# Patient Record
Sex: FEMALE | Race: WHITE | NOT HISPANIC OR LATINO | URBAN - METROPOLITAN AREA
[De-identification: names, ages, dates, MRNs, and addresses within clinical notes are randomized per-mention and may not be internally consistent; named-entity substitution may affect disease eponyms.]

---

## 2019-11-21 ENCOUNTER — EMERGENCY (EMERGENCY)
Facility: HOSPITAL | Age: 44
LOS: 1 days | Discharge: HOME | End: 2019-11-21
Attending: EMERGENCY MEDICINE | Admitting: EMERGENCY MEDICINE
Payer: COMMERCIAL

## 2019-11-21 VITALS
OXYGEN SATURATION: 97 % | DIASTOLIC BLOOD PRESSURE: 100 MMHG | HEART RATE: 91 BPM | HEIGHT: 67 IN | TEMPERATURE: 98 F | RESPIRATION RATE: 18 BRPM | SYSTOLIC BLOOD PRESSURE: 167 MMHG | WEIGHT: 229.94 LBS

## 2019-11-21 VITALS
HEART RATE: 80 BPM | TEMPERATURE: 98 F | DIASTOLIC BLOOD PRESSURE: 93 MMHG | RESPIRATION RATE: 18 BRPM | SYSTOLIC BLOOD PRESSURE: 148 MMHG | OXYGEN SATURATION: 97 %

## 2019-11-21 PROCEDURE — 85025 COMPLETE CBC W/AUTO DIFF WBC: CPT

## 2019-11-21 PROCEDURE — 76856 US EXAM PELVIC COMPLETE: CPT

## 2019-11-21 PROCEDURE — 99284 EMERGENCY DEPT VISIT MOD MDM: CPT | Mod: 25

## 2019-11-21 PROCEDURE — 76856 US EXAM PELVIC COMPLETE: CPT | Mod: 26

## 2019-11-21 PROCEDURE — 99284 EMERGENCY DEPT VISIT MOD MDM: CPT

## 2019-11-21 PROCEDURE — 80053 COMPREHEN METABOLIC PANEL: CPT

## 2019-11-21 PROCEDURE — 76830 TRANSVAGINAL US NON-OB: CPT | Mod: 26

## 2019-11-21 PROCEDURE — 96374 THER/PROPH/DIAG INJ IV PUSH: CPT

## 2019-11-21 PROCEDURE — 81003 URINALYSIS AUTO W/O SCOPE: CPT

## 2019-11-21 PROCEDURE — 74176 CT ABD & PELVIS W/O CONTRAST: CPT

## 2019-11-21 PROCEDURE — 87086 URINE CULTURE/COLONY COUNT: CPT

## 2019-11-21 PROCEDURE — 76830 TRANSVAGINAL US NON-OB: CPT

## 2019-11-21 PROCEDURE — 36415 COLL VENOUS BLD VENIPUNCTURE: CPT

## 2019-11-21 PROCEDURE — 74176 CT ABD & PELVIS W/O CONTRAST: CPT | Mod: 26

## 2019-11-21 RX ORDER — METHOCARBAMOL 500 MG/1
2 TABLET, FILM COATED ORAL
Qty: 40 | Refills: 0
Start: 2019-11-21 | End: 2019-11-30

## 2019-11-21 RX ORDER — IBUPROFEN 200 MG
1 TABLET ORAL
Qty: 30 | Refills: 0
Start: 2019-11-21

## 2019-11-21 RX ORDER — KETOROLAC TROMETHAMINE 30 MG/ML
15 SYRINGE (ML) INJECTION ONCE
Refills: 0 | Status: DISCONTINUED | OUTPATIENT
Start: 2019-11-21 | End: 2019-11-21

## 2019-11-21 RX ORDER — LIDOCAINE 4 G/100G
1 CREAM TOPICAL ONCE
Refills: 0 | Status: COMPLETED | OUTPATIENT
Start: 2019-11-21 | End: 2019-11-21

## 2019-11-21 RX ORDER — METHOCARBAMOL 500 MG/1
1500 TABLET, FILM COATED ORAL ONCE
Refills: 0 | Status: COMPLETED | OUTPATIENT
Start: 2019-11-21 | End: 2019-11-21

## 2019-11-21 RX ADMIN — Medication 15 MILLIGRAM(S): at 16:19

## 2019-11-21 RX ADMIN — METHOCARBAMOL 1500 MILLIGRAM(S): 500 TABLET, FILM COATED ORAL at 20:20

## 2019-11-21 RX ADMIN — LIDOCAINE 1 PATCH: 4 CREAM TOPICAL at 20:21

## 2019-11-21 RX ADMIN — Medication 15 MILLIGRAM(S): at 16:34

## 2019-11-21 NOTE — ED ADULT NURSE NOTE - NSIMPLEMENTINTERV_GEN_ALL_ED
Implemented All Universal Safety Interventions:  [place] to call system. Call bell, personal items and telephone within reach. Instruct patient to call for assistance. Room bathroom lighting operational. Non-slip footwear when patient is off stretcher. Physically safe environment: no spills, clutter or unnecessary equipment. Stretcher in lowest position, wheels locked, appropriate side rails in place.

## 2019-11-21 NOTE — ED PROVIDER NOTE - NSFOLLOWUPINSTRUCTIONS_ED_ALL_ED_FT
BACK PAIN - General Information     Back Pain    WHAT YOU NEED TO KNOW:    What do I need to know about back pain? Back pain is common. You may feel sore or stiff on one or both sides of your back. The pain may spread to your buttocks or thighs.    What causes or increases my risk for back pain? Conditions that affect the spine, joints, or muscles can cause back pain. These may include arthritis, spinal stenosis (narrowing of the spinal column), muscle tension, or breakdown of the spinal discs. The following increase your risk of back pain:     Repeated bending, lifting, or twisting, or lifting heavy items      Injury from a fall or accident      Lack of regular physical activity       Obesity or pregnancy       Smoking      Aging      Driving, sitting, or standing for long periods      Bad posture while sitting or standing    How is back pain diagnosed? Your healthcare provider will ask if you have any medical conditions. He or she may ask if you have a history of back pain and how it started. He or she may watch you stand and walk, and check your range of motion. Show him or her where you feel pain and what makes it better or worse. Describe the pain, how bad it is, and how long it lasts. Tell your provider if your pain worsens at night or when you lie on your back.    How is back pain treated?    NSAIDs help decrease swelling and pain. This medicine is available with or without a doctor's order. NSAIDs can cause stomach bleeding or kidney problems in certain people. If you take blood thinner medicine, always ask your healthcare provider if NSAIDs are safe for you. Always read the medicine label and follow directions.      Acetaminophen decreases pain and fever. It is available without a doctor's order. Ask how much to take and how often to take it. Follow directions. Read the labels of all other medicines you are using to see if they also contain acetaminophen, or ask your doctor or pharmacist. Acetaminophen can cause liver damage if not taken correctly. Do not use more than 4 grams (4,000 milligrams) total of acetaminophen in one day.       Muscle relaxers help decrease muscle spasms and back pain.      Prescription pain medicine may be given. Ask your healthcare provider how to take this medicine safely. Some prescription pain medicines contain acetaminophen. Do not take other medicines that contain acetaminophen without talking to your healthcare provider. Too much acetaminophen may cause liver damage. Prescription pain medicine may cause constipation. Ask your healthcare provider how to prevent or treat constipation.     How do I manage my back pain?     Apply ice on your back for 15 to 20 minutes every hour or as directed. Use an ice pack, or put crushed ice in a plastic bag. Cover it with a towel before you apply it to your skin. Ice helps prevent tissue damage and decreases pain.      Apply heat on your back for 20 to 30 minutes every 2 hours for as many days as directed. Heat helps decrease pain and muscle spasms.      Stay active as much as you can without causing more pain. Bed rest could make your back pain worse. Avoid heavy lifting until your pain is gone.      Go to physical therapy as directed. A physical therapist can teach you exercises to help improve movement and strength, and to decrease pain.    When should I seek immediate care?     You have pain, numbness, or weakness in one or both legs.      Your pain becomes so severe that you cannot walk.      You cannot control your urine or bowel movements.      You have severe back pain with chest pain.      You have severe back pain, nausea, and vomiting.      You have severe back pain that spreads to your side or genital area.    When should I contact my healthcare provider?     You have back pain that does not get better with rest and pain medicine.      You have a fever.      You have pain that worsens when you are on your back or when you rest.      You have pain that worsens when you cough or sneeze.      You lose weight without trying.      You have questions or concerns about your condition or care.    CARE AGREEMENT:    You have the right to help plan your care. Learn about your health condition and how it may be treated. Discuss treatment options with your healthcare providers to decide what care you want to receive. You always have the right to refuse treatment.        © Copyright [employer] 2019       back to top                      © Copyright [employer] 2019

## 2019-11-21 NOTE — ED PROVIDER NOTE - OBJECTIVE STATEMENT
44F nonsmoker, otherwise healthy, lmp 10/24/19, c/o acute nonpositional sharp L flank pain this AM that has now radiated to L lateral abd. no fever/chills, no uri/cough, no cp/sob, no n/v, no diarrhea/constipation, no hematochezia/melena, no dysuria/hematuria, no vaginal discharge/spotting, no phx std, no phx/fhx nephrolithiasis, no rash, no trauma, no prior episode.

## 2019-11-21 NOTE — ED PROVIDER NOTE - PATIENT PORTAL LINK FT
You can access the FollowMyHealth Patient Portal offered by [facility] by registering at the following website: [email or web address]. By joining [employer]’s FollowMyHealth portal, you will also be able to view your health information using other applications (apps) compatible with our system.

## 2019-11-21 NOTE — ED PROVIDER NOTE - CLINICAL SUMMARY MEDICAL DECISION MAKING FREE TEXT BOX
avss. nontoxic. NAD. no acute surgical abd. no red flags. no risk factors. pain controlled. tolerating po. no e/o sepsis. ct a/p and pelvic us only w/ e/o fibroids. will dc home w/ outpatient pcp fu, robaxin/ibuprofen,  strict return precautions. pt agrees w/ plan. questions answered.

## 2019-11-21 NOTE — ED ADULT NURSE NOTE - OBJECTIVE STATEMENT
Pt CO sudden onset Left flank pain today.  Pt states "this has never happened before."  Pt denies falls, trauma, dizziness, N/V/D, SOB, Fevers and CP.  No sig PMhx per pt.

## 2019-11-21 NOTE — ED PROVIDER NOTE - PHYSICAL EXAMINATION
CONST: nontoxic NAD speaking in full sentences  HEAD: atraumatic  EYES: conjunctivae clear  ENT: mmm  NECK: supple  CARD: rrr no murmurs  CHEST: ctab no r/r/w, no stridor/retractions/tripoding  ABD: soft, nd, +mild L mid abd ttp, no rebound/guarding, +L cvat  EXT: FROM, symmetric distal pulses intact  SKIN: warm, dry, no rash, no pedal edema, cap refill <2sec  NEURO: a+ox3, 5/5 strength x4, gross sensation intact x4, normal gait

## 2019-11-25 DIAGNOSIS — R10.9 UNSPECIFIED ABDOMINAL PAIN: ICD-10-CM

## 2019-11-25 DIAGNOSIS — M54.9 DORSALGIA, UNSPECIFIED: ICD-10-CM

## 2019-11-25 DIAGNOSIS — D25.9 LEIOMYOMA OF UTERUS, UNSPECIFIED: ICD-10-CM

## 2021-01-21 ENCOUNTER — EMERGENCY (EMERGENCY)
Facility: HOSPITAL | Age: 46
LOS: 1 days | Discharge: HOME | End: 2021-01-21
Attending: EMERGENCY MEDICINE | Admitting: EMERGENCY MEDICINE
Payer: COMMERCIAL

## 2021-01-21 VITALS
SYSTOLIC BLOOD PRESSURE: 163 MMHG | HEIGHT: 67 IN | OXYGEN SATURATION: 99 % | DIASTOLIC BLOOD PRESSURE: 96 MMHG | HEART RATE: 92 BPM | RESPIRATION RATE: 18 BRPM | WEIGHT: 235.01 LBS | TEMPERATURE: 98 F

## 2021-01-21 VITALS
HEART RATE: 77 BPM | SYSTOLIC BLOOD PRESSURE: 161 MMHG | DIASTOLIC BLOOD PRESSURE: 101 MMHG | RESPIRATION RATE: 16 BRPM | OXYGEN SATURATION: 100 %

## 2021-01-21 DIAGNOSIS — R42 DIZZINESS AND GIDDINESS: ICD-10-CM

## 2021-01-21 DIAGNOSIS — Z88.8 ALLERGY STATUS TO OTHER DRUGS, MEDICAMENTS AND BIOLOGICAL SUBSTANCES: ICD-10-CM

## 2021-01-21 PROBLEM — Z78.9 OTHER SPECIFIED HEALTH STATUS: Chronic | Status: ACTIVE | Noted: 2019-11-21

## 2021-01-21 LAB
ALBUMIN SERPL ELPH-MCNC: 4.3 G/DL — SIGNIFICANT CHANGE UP (ref 3.3–5)
ALP SERPL-CCNC: 85 U/L — SIGNIFICANT CHANGE UP (ref 40–120)
ALT FLD-CCNC: 17 U/L — SIGNIFICANT CHANGE UP (ref 10–45)
ANION GAP SERPL CALC-SCNC: 13 MMOL/L — SIGNIFICANT CHANGE UP (ref 5–17)
AST SERPL-CCNC: 23 U/L — SIGNIFICANT CHANGE UP (ref 10–40)
BASOPHILS # BLD AUTO: 0.05 K/UL — SIGNIFICANT CHANGE UP (ref 0–0.2)
BASOPHILS NFR BLD AUTO: 0.6 % — SIGNIFICANT CHANGE UP (ref 0–2)
BILIRUB SERPL-MCNC: 0.4 MG/DL — SIGNIFICANT CHANGE UP (ref 0.2–1.2)
BUN SERPL-MCNC: 12 MG/DL — SIGNIFICANT CHANGE UP (ref 7–23)
CALCIUM SERPL-MCNC: 9 MG/DL — SIGNIFICANT CHANGE UP (ref 8.4–10.5)
CHLORIDE SERPL-SCNC: 100 MMOL/L — SIGNIFICANT CHANGE UP (ref 96–108)
CO2 SERPL-SCNC: 26 MMOL/L — SIGNIFICANT CHANGE UP (ref 22–31)
CREAT SERPL-MCNC: 0.52 MG/DL — SIGNIFICANT CHANGE UP (ref 0.5–1.3)
EOSINOPHIL # BLD AUTO: 0.19 K/UL — SIGNIFICANT CHANGE UP (ref 0–0.5)
EOSINOPHIL NFR BLD AUTO: 2.2 % — SIGNIFICANT CHANGE UP (ref 0–6)
GLUCOSE SERPL-MCNC: 108 MG/DL — HIGH (ref 70–99)
HCG UR QL: NEGATIVE — SIGNIFICANT CHANGE UP
HCT VFR BLD CALC: 45.8 % — HIGH (ref 34.5–45)
HGB BLD-MCNC: 15.4 G/DL — SIGNIFICANT CHANGE UP (ref 11.5–15.5)
IMM GRANULOCYTES NFR BLD AUTO: 0.7 % — SIGNIFICANT CHANGE UP (ref 0–1.5)
LYMPHOCYTES # BLD AUTO: 2.92 K/UL — SIGNIFICANT CHANGE UP (ref 1–3.3)
LYMPHOCYTES # BLD AUTO: 34.1 % — SIGNIFICANT CHANGE UP (ref 13–44)
MCHC RBC-ENTMCNC: 30.9 PG — SIGNIFICANT CHANGE UP (ref 27–34)
MCHC RBC-ENTMCNC: 33.6 GM/DL — SIGNIFICANT CHANGE UP (ref 32–36)
MCV RBC AUTO: 92 FL — SIGNIFICANT CHANGE UP (ref 80–100)
MONOCYTES # BLD AUTO: 0.91 K/UL — HIGH (ref 0–0.9)
MONOCYTES NFR BLD AUTO: 10.6 % — SIGNIFICANT CHANGE UP (ref 2–14)
NEUTROPHILS # BLD AUTO: 4.43 K/UL — SIGNIFICANT CHANGE UP (ref 1.8–7.4)
NEUTROPHILS NFR BLD AUTO: 51.8 % — SIGNIFICANT CHANGE UP (ref 43–77)
NRBC # BLD: 0 /100 WBCS — SIGNIFICANT CHANGE UP (ref 0–0)
PLATELET # BLD AUTO: 235 K/UL — SIGNIFICANT CHANGE UP (ref 150–400)
POTASSIUM SERPL-MCNC: 3.7 MMOL/L — SIGNIFICANT CHANGE UP (ref 3.5–5.3)
POTASSIUM SERPL-SCNC: 3.7 MMOL/L — SIGNIFICANT CHANGE UP (ref 3.5–5.3)
PROT SERPL-MCNC: 7.9 G/DL — SIGNIFICANT CHANGE UP (ref 6–8.3)
RBC # BLD: 4.98 M/UL — SIGNIFICANT CHANGE UP (ref 3.8–5.2)
RBC # FLD: 13.6 % — SIGNIFICANT CHANGE UP (ref 10.3–14.5)
SODIUM SERPL-SCNC: 139 MMOL/L — SIGNIFICANT CHANGE UP (ref 135–145)
WBC # BLD: 8.56 K/UL — SIGNIFICANT CHANGE UP (ref 3.8–10.5)
WBC # FLD AUTO: 8.56 K/UL — SIGNIFICANT CHANGE UP (ref 3.8–10.5)

## 2021-01-21 PROCEDURE — 93005 ELECTROCARDIOGRAM TRACING: CPT

## 2021-01-21 PROCEDURE — 82962 GLUCOSE BLOOD TEST: CPT

## 2021-01-21 PROCEDURE — 99284 EMERGENCY DEPT VISIT MOD MDM: CPT

## 2021-01-21 PROCEDURE — 36415 COLL VENOUS BLD VENIPUNCTURE: CPT

## 2021-01-21 PROCEDURE — 99283 EMERGENCY DEPT VISIT LOW MDM: CPT | Mod: 25

## 2021-01-21 PROCEDURE — 85025 COMPLETE CBC W/AUTO DIFF WBC: CPT

## 2021-01-21 PROCEDURE — 80053 COMPREHEN METABOLIC PANEL: CPT

## 2021-01-21 PROCEDURE — 81025 URINE PREGNANCY TEST: CPT

## 2021-01-21 RX ORDER — MECLIZINE HCL 12.5 MG
1 TABLET ORAL
Qty: 18 | Refills: 0
Start: 2021-01-21 | End: 2021-01-26

## 2021-01-21 RX ORDER — MECLIZINE HCL 12.5 MG
25 TABLET ORAL ONCE
Refills: 0 | Status: COMPLETED | OUTPATIENT
Start: 2021-01-21 | End: 2021-01-21

## 2021-01-21 RX ORDER — SODIUM CHLORIDE 9 MG/ML
1000 INJECTION INTRAMUSCULAR; INTRAVENOUS; SUBCUTANEOUS ONCE
Refills: 0 | Status: COMPLETED | OUTPATIENT
Start: 2021-01-21 | End: 2021-01-21

## 2021-01-21 RX ADMIN — SODIUM CHLORIDE 1000 MILLILITER(S): 9 INJECTION INTRAMUSCULAR; INTRAVENOUS; SUBCUTANEOUS at 07:59

## 2021-01-21 RX ADMIN — Medication 25 MILLIGRAM(S): at 07:59

## 2021-01-21 NOTE — ED ADULT NURSE REASSESSMENT NOTE - NS ED NURSE REASSESS COMMENT FT1
Nursing care transferred to YUDY Quiroga from night shift. Pt resting in stretcher, NS running, IV site patent. Pt reports persistent feeling of "lightness" in her words. Plan to reassess 50 mins s/p meclizine at 8:50.

## 2021-01-21 NOTE — ED PROVIDER NOTE - PROGRESS NOTE DETAILS
[place]: labs grossly wnl, pt asymptomatic. Clinically no indication for further emergent ED workup or hospitalization at this time. Comfortable for dc, outpt f/u.

## 2021-01-21 NOTE — ED ADULT NURSE NOTE - OBJECTIVE STATEMENT
47 y/o female w/ PMH HTN presents to ED for c/o dizziness noted upon waking this am around 0500 "like the room is spinning", with some associated lightheadedness. denies facial droop/slurred speech/weakness/CP/SOB.

## 2021-01-21 NOTE — ED PROVIDER NOTE - PATIENT PORTAL LINK FT
You can access the FollowMyHealth Patient Portal offered by [facility] by registering at the following website: [email or web address]. By joining [email or web address] FollowMyHealth portal, you will also be able to view your health information using other applications (apps) compatible with our system.

## 2021-01-21 NOTE — ED PROVIDER NOTE - PHYSICAL EXAMINATION
PERRL, EOMI, no nystagmus. CN intact. Strength 5/5. Normal F-->N, H-->S. Steady unassisted gait. No pronator drift. Sensation intact. Normal speech, no dysarthria. No carotid bruits. Negative Romberg, normal tandem gait.   no LE edema, normal equal distal pulses, steady unassisted gait.

## 2021-01-21 NOTE — ED PROVIDER NOTE - CLINICAL SUMMARY MEDICAL DECISION MAKING FREE TEXT BOX
46F PMH HTN, p/w dizziness. Went to sleep ~2100 feeling like normal self. Awoke this morning  feeling vague dizziness/lightheaded. Cant fully describe but feels like shes hung over, only w/ position change, lasts ~1min then resolves. No other systemic symptoms. Mild HTN (has not yet taken am BP med), other vitals wnl, exam as above. Very well appearing.  ddx: Likely peripheral vertigo. Clinically not CVA.   BAsic labs, ucg, IVF/meclizine, observe in ED, reassess.

## 2021-01-21 NOTE — ED ADULT TRIAGE NOTE - OTHER COMPLAINTS
pt woke up at 5 am feeling dizzy and lightheaded, also mild headache, no vision changes, no focal weakness, went to sleep at 1 am feeling normal, speech clear, no facial drooping or arm drift, hx of HTN only

## 2021-01-21 NOTE — ED PROVIDER NOTE - OBJECTIVE STATEMENT
46F PMH HTN, p/w dizziness. Went to sleep ~2100 feeling like normal self. Awoke this morning  feeling vague dizziness/lightheaded. Cant fully describe but feels like shes hung over, only w/ position change, lasts ~1min then resolves. Not similar to prior. Does not feel off balance or near syncope. Denies HA, vision changes, focal weakness/numbness, neck pain, rashes, tinnitus, hearing changes, URI symptoms, f/c, SOB/CP, NVD, abd pain, urinary complaints, black/bloody stool, lifestyle/dietary/medication changes.   meds: amlodipine 5mg qd, no recent changes

## 2021-01-21 NOTE — ED PROVIDER NOTE - NSFOLLOWUPINSTRUCTIONS_ED_ALL_ED_FT
It is unclear what exactly is causing your symptoms but it is likely caused by vertigo! It is important to continue following up with your doctor outside the hospital and to return to ER for: Persistent fever/vomiting, uncontrolled pain, worsening swelling, worsening breathing, worsening lightheaded, spreading redness.     Follow up with primary doctor within 1-2 days.  Follow up with neurologist within 1 week. Can call [phone] to schedule appointment.   Can take meclizine as prescribed.  Stay well hydrated.  Return to ER for persistent fever/vomit, uncontrolled pain, focal weakness/numbness, vision changes, worsening dizziness, worsening lightheadedness.    Vertigo is the feeling that you or your surroundings are moving when they are not. Vertigo can be dangerous if it occurs while you are doing something that could endanger you or others, such as driving.    What are the causes?  This condition is caused by a disturbance in the signals that are sent by your body’s sensory systems to your brain. Different causes of a disturbance can lead to vertigo, including:  Infections, especially in the inner ear.  A bad reaction to a drug, or misuse of alcohol and medicines.  Withdrawal from drugs or alcohol.  Quickly changing positions, as when lying down or rolling over in bed.  Migraine headaches.  Decreased blood flow to the brain.  Decreased blood pressure.  Increased pressure in the brain from a head or neck injury, stroke, infection, tumor, or bleeding.  Central nervous system disorders.    What are the signs or symptoms?  Symptoms of this condition usually occur when you move your head or your eyes in different directions. Symptoms may start suddenly, and they usually last for less than a minute. Symptoms may include:  Loss of balance and falling.  Feeling like you are spinning or moving.  Feeling like your surroundings are spinning or moving.  Nausea and vomiting.  Blurred vision or double vision.  Difficulty hearing.  Slurred speech.  Dizziness.  Involuntary eye movement (nystagmus).  Symptoms can be mild and cause only slight annoyance, or they can be severe and interfere with daily life. Episodes of vertigo may return (recur) over time, and they are often triggered by certain movements. Symptoms may improve over time.    How is this diagnosed?  This condition may be diagnosed based on medical history and the quality of your nystagmus. Your health care provider may test your eye movements by asking you to quickly change positions to trigger the nystagmus. This may be called the Gina-Hallpike test, head thrust test, or roll test. You may be referred to a health care provider who specializes in ear, nose, and throat (ENT) problems (otolaryngologist) or a provider who specializes in disorders of the central nervous system (neurologist).    You may have additional testing, including:  A physical exam.  Blood tests.  MRI.  A CT scan.  An electrocardiogram (ECG). This records electrical activity in your heart.  An electroencephalogram (EEG). This records electrical activity in your brain.  Hearing tests.  How is this treated?  Treatment for this condition depends on the cause and the severity of the symptoms. Treatment options include:  Medicines to treat nausea or vertigo. These are usually used for severe cases. Some medicines that are used to treat other conditions may also reduce or eliminate vertigo symptoms. These include:  Medicines that control allergies (antihistamines).  Medicines that control seizures (anticonvulsants).  Medicines that relieve depression (antidepressants).  Medicines that relieve anxiety (sedatives).  Head movements to adjust your inner ear back to normal. If your vertigo is caused by an ear problem, your health care provider may recommend certain movements to correct the problem.  Surgery. This is rare.    Follow these instructions at home:  Safety  Move slowly. Avoid sudden body or head movements.  Avoid driving.  Avoid operating heavy machinery.  Avoid doing any tasks that would cause danger to you or others if you would have a vertigo episode during the task.  If you have trouble walking or keeping your balance, try using a cane for stability. If you feel dizzy or unstable, sit down right away.  Return to your normal activities as told by your health care provider. Ask your health care provider what activities are safe for you.    General instructions   Take over-the-counter and prescription medicines only as told by your health care provider.  Avoid certain positions or movements as told by your health care provider.  Drink enough fluid to keep your urine clear or pale yellow.  Keep all follow-up visits as told by your health care provider. This is important.  Contact a health care provider if:  Your medicines do not relieve your vertigo or they make it worse.  You have a fever.  Your condition gets worse or you develop new symptoms.  Your family or friends notice any behavioral changes.  Your nausea or vomiting gets worse.  You have numbness or a “pins and needles” sensation in part of your body.  Get help right away if:  You have difficulty moving or speaking.  You are always dizzy.  You faint.  You develop severe headaches.  You have weakness in your hands, arms, or legs.  You have changes in your hearing or vision.  You develop a stiff neck.  You develop sensitivity to light.

## 2022-07-21 NOTE — ED ADULT NURSE NOTE - HIV OFFER
Daily Note     Today's date: 2022  Patient name: Himanshu Chaves  : 1944  MRN: [id number]  Referring provider: Parvin Bhakta  Dx:   Encounter Diagnosis     ICD-10-CM    1  Acute left-sided low back pain with left-sided sciatica  M54 44                   Subjective:  Patient states that she is sore from vacuuming her house today  Objective: See treatment diary below  Assessment:  Patient demonstrates increased ability to walk c/ less pain post tx  Tolerated treatment well  Patient would benefit from continued PT  Plan: Continue per plan of care        Precautions:  DM; HTN; osteoporosis    Manuals        Piriformis release  AZ AZ AZ AZ AZ       STM/MFR  AZ AZ AZ AZ AZ                                 Neuro Re-Ed                                                    Ther Ex             Bike   5' 10' 10' 10'       SKTC 10x 10s 10x 10s 10x 10s 10x 10s 10x 10s 10x 10s       Sciatic n glide 10x 10x 10x 10x 10x 10x       LTR 20x5s 20x5s 20x5s 20x5s 20x5s 20x5s       Sup piriformis str 3x30s 3x30s 3x30s 3x30s 3x30s 3x30s       Seated piriformis str      3x30s                    Ther Activity                                       Gait Training                                       Modalities              10' 10' 10' 10' 10' Opt out

## 2022-11-07 ENCOUNTER — OFFICE (OUTPATIENT)
Dept: URBAN - METROPOLITAN AREA CLINIC 92 | Facility: CLINIC | Age: 47
Setting detail: OPHTHALMOLOGY
End: 2022-11-07
Payer: COMMERCIAL

## 2022-11-07 DIAGNOSIS — D31.02: ICD-10-CM

## 2022-11-07 PROBLEM — H52.7 REFRACTIVE ERROR: Status: ACTIVE | Noted: 2022-11-07

## 2022-11-07 PROBLEM — H10.45 ALLERGIC CONJUNCTIVITIS: Status: ACTIVE | Noted: 2022-11-07

## 2022-11-07 PROCEDURE — 99213 OFFICE O/P EST LOW 20 MIN: CPT | Performed by: OPHTHALMOLOGY

## 2022-11-07 ASSESSMENT — REFRACTION_AUTOREFRACTION
OS_AXIS: 00
OS_CYLINDER: 0.00
OS_SPHERE: -0.25
OD_SPHERE: -1.00
OD_AXIS: 138
OD_CYLINDER: +0.50

## 2022-11-07 ASSESSMENT — KERATOMETRY
OD_K1POWER_DIOPTERS: 42.75
OS_K2POWER_DIOPTERS: 43.25
OD_AXISANGLE_DEGREES: 096
OS_AXISANGLE_DEGREES: 098
METHOD_AUTO_MANUAL: AUTO
OS_K1POWER_DIOPTERS: 42.75
OD_K2POWER_DIOPTERS: 43.25

## 2022-11-07 ASSESSMENT — REFRACTION_MANIFEST
OD_SPHERE: -0.75
OS_SPHERE: -0.25
OD_VA1: 20/20
OU_VA: 20/15-
OS_CYLINDER: SPH
OD_CYLINDER: SPH
OS_VA1: 20/20

## 2022-11-07 ASSESSMENT — AXIALLENGTH_DERIVED
OS_AL: 23.8763
OD_AL: 24.0776

## 2022-11-07 ASSESSMENT — CONFRONTATIONAL VISUAL FIELD TEST (CVF)
OD_FINDINGS: FULL
OS_FINDINGS: FULL

## 2022-11-07 ASSESSMENT — SUPERFICIAL PUNCTATE KERATITIS (SPK)
OS_SPK: T
OD_SPK: T

## 2022-11-07 ASSESSMENT — VISUAL ACUITY
OS_BCVA: 20/30
OD_BCVA: 20/25

## 2022-11-07 ASSESSMENT — SPHEQUIV_DERIVED
OS_SPHEQUIV: -0.25
OD_SPHEQUIV: -0.75

## 2022-12-12 NOTE — ED ADULT NURSE NOTE - TEMPLATE
MEDICATIONS  (STANDING):  lithium 600 milliGRAM(s) Oral daily  lithium 900 milliGRAM(s) Oral at bedtime  polyethylene glycol 3350 17 Gram(s) Oral daily    MEDICATIONS  (PRN):  acetaminophen    Suspension .. 650 milliGRAM(s) Oral every 6 hours PRN Temp greater or equal to 38C (100.4F), Mild Pain (1 - 3), Moderate Pain (4 - 6)  diphenhydrAMINE 50 milliGRAM(s) Oral every 6 hours PRN agitation  diphenhydrAMINE Injectable 50 milliGRAM(s) IntraMuscular once PRN agitation  haloperidol     Tablet 5 milliGRAM(s) Oral every 6 hours PRN agitation  haloperidol    Injectable 5 milliGRAM(s) IntraMuscular once PRN Agitation  ibuprofen  Tablet. 600 milliGRAM(s) Oral every 6 hours PRN Mild Pain (1 - 3), Moderate Pain (4 - 6)  LORazepam     Tablet 2 milliGRAM(s) Oral every 6 hours PRN Agitation  LORazepam   Injectable 2 milliGRAM(s) IntraMuscular Once PRN Agitation  melatonin. 5 milliGRAM(s) Oral at bedtime PRN Insomnia  senna 2 Tablet(s) Oral daily PRN constipation  traZODone 50 milliGRAM(s) Oral at bedtime PRN insomnia   General

## 2024-09-28 NOTE — ED PROVIDER NOTE - DISCHARGE REVIEW MATERIAL PRESENTED
Pt x4, VSS. Pt is progressing and currently resting. PT care ongoing.  Problem: Adult Inpatient Plan of Care  Goal: Plan of Care Review  9/27/2024 2228 by Geovanni Jain RN  Outcome: Progressing  9/27/2024 2227 by Geovanni Jain RN  Outcome: Progressing  Goal: Patient-Specific Goal (Individualized)  9/27/2024 2228 by Geovanni Jain RN  Outcome: Progressing  9/27/2024 2227 by Geovanni Jain RN  Outcome: Progressing  Goal: Absence of Hospital-Acquired Illness or Injury  9/27/2024 2228 by Geovanni Jain RN  Outcome: Progressing  9/27/2024 2227 by Geovanni Jain RN  Outcome: Progressing  Goal: Optimal Comfort and Wellbeing  9/27/2024 2228 by Geovanni Jain RN  Outcome: Progressing  9/27/2024 2227 by Geovanni Jain RN  Outcome: Progressing      .

## 2024-09-30 NOTE — ED ADULT TRIAGE NOTE - BEFAST ARM NUMBNESS
Internal Medicine H3  Progress Note    PCP: Michael Ca MD    SUBJECTIVE     No acute events overnight. Patient seen and examined at bedside this morning. Patient endorses lower abdominal pain still present but improved. No acute concerns or complaints at this time. Denies chest pain, fevers, chills, nausea, vomiting, diarrhea, constipation.    OBJECTIVE     CURRENT MEDS  Current Facility-Administered Medications   Medication    fluticasone (FLONASE) 50 MCG/ACT nasal spray 1 spray    Prothrombin Complex(4 factors) Conc (KCENTRA, 4FPCC) injection 2,156 Units    [Held by provider] apixaBAN (ELIQUIS) tablet 5 mg    [Held by provider] hydroCHLOROthiazide tablet 12.5 mg    levothyroxine (SYNTHROID, LEVOTHROID) tablet 125 mcg    [Held by provider] losartan (COZAAR) tablet 25 mg    [Held by provider] metoPROLOL succinate (TOPROL-XL) ER tablet 50 mg    rosuvastatin (CRESTOR) tablet 20 mg    sertraline (ZOLOFT) tablet 50 mg    sodium chloride 0.9 % flush bag 25 mL    sodium chloride 0.9 % injection 2 mL    sodium chloride (NORMAL SALINE) 0.9 % bolus 500 mL    acetaminophen (TYLENOL) tablet 650 mg    Or    acetaminophen (TYLENOL) suppository 650 mg    melatonin tablet 3 mg    Potassium Standard Replacement Protocol (Levels 3.5 and lower)    Potassium Replacement (Levels 3.6 - 4)    Magnesium Standard Replacement Protocol    Phosphorus Standard Replacement Protocol    hydrALAZINE (APRESOLINE) injection 10 mg    potassium CHLORIDE (KLOR-CON M) yuliet ER tablet 40 mEq    trimethobenzamide (TIGAN) injection 200 mg        VITAL SIGNS    Vital Last Value 24 Hour Range   Temperature 98.6 °F (37 °C) (09/30/24 0732) Temp  Min: 97.7 °F (36.5 °C)  Max: 98.6 °F (37 °C)   Pulse (!) 60 (09/30/24 0732) Pulse  Min: 56  Max: 64   Respiratory 16 (09/30/24 0303) Resp  Min: 16  Max: 18   Non-Invasive  Blood Pressure 127/73 (09/30/24 0732) BP  Min: 111/69  Max: 127/73   Pulse Oximetry 96 % (09/30/24 0732) SpO2  Min: 95 %  Max: 97 %      PHYSICAL EXAM  Physical Exam  HENT:      Head: Normocephalic.      Neck: Normal range of motion.   Eyes:      Pupils: Pupils are equal, round, and reactive to light.   Cardiovascular:      Rate and Rhythm: Normal rate and regular rhythm.      Pulses: Normal pulses.      Heart sounds: Normal heart sounds.   Pulmonary:      Effort: Pulmonary effort is normal.      Breath sounds: Normal breath sounds.   Abdominal:      Tenderness: There is abdominal tenderness.   Musculoskeletal:         General: Normal range of motion.   Skin:     General: Skin is warm.      Comments: No signs of bleeding   Neurological:      General: No focal deficit present.      Mental Status: She is alert and oriented to person, place, and time.   Psychiatric:         Mood and Affect: Mood normal.         INTAKE/OUTPUT    Intake/Output Summary (Last 24 hours) at 9/30/2024 0901  Last data filed at 9/30/2024 0800  Gross per 24 hour   Intake 120 ml   Output --   Net 120 ml       LABS  CBC  Recent Labs     09/29/24  1401 09/29/24  1747 09/29/24  2006 09/30/24  0442   WBC 9.1 10.7  --  7.2   RBC 3.81* 4.17  --  3.87*   HGB 11.7* 12.6 12.3 11.7*   HCT 34.9* 37.6 36.7 35.4*   MCV 91.6 90.2  --  91.5   MCH 30.7 30.2  --  30.2   MCHC 33.5 33.5  --  33.1    235  --  204       CMP  Recent Labs     09/28/24  0856 09/29/24  0454 09/30/24  0442   SODIUM 137 136 139   POTASSIUM 3.7 3.9 3.9   CHLORIDE 102 102 104   CO2 25 25 26   ANIONGAP 14 13 13   BUN 22* 16 15   CREATININE 0.55 0.55 0.55   GLUCOSE 113* 110* 98   CALCIUM 8.7 8.5 8.1*   ALBUMIN 3.2* 3.1* 2.9*   BILIRUBIN 0.6 0.6 0.6   ALKPT 89 75 72   AST 15 21 22       UA  Lab Results   Component Value Date    UWBC Negative 09/28/2024    URBC Negative 09/28/2024        MICRO  No components found for: \"CURINE\"  No components found for: \"CBLOOD\", \"CFUNGUSBL\"    IMAGING  No results found.      ASSESSMENT AND PLAN     Patient is a 79 year old female with a PMHx of HTN, HLD, Afib on Eliquis who is  presenting with abdominal pain x2 weeks which worsened since last night.     #Abdominal pain  #Right rectus sheath hematoma  - Pt presents with 2 weeks  - OE: Bradycardic, collection palpated on the right of the umbilicus, tender to palpation. No guarding. Denies Fever.  - s/p cholecystectomy in 2021  - Labs: CBC wnl  - CT a/p: Large rectus sheath hematoma primarily in the right lower anterior abdominal wall with tiny foci of active hemorrhage.  Plan:  - H&H q8h  - Serial abdominal exams  - Surgery consulted  No surgical interventions at this time  Hold anticoagulation for 1 week  - s/p coil embolization with IR on 9/28  - Pt is being discharged.     #Paroxysmal Atrial fibrillation  - PTA meds: Eliquis 5mg BID and metoprolol succinate 50 mg QD   - On dc continue Toprol 50 BID  - Holding Eliquis for 1 week  - Pt will follow up with EP (Dr. Davies) outpatient for Watchman Procedure     #Hypertension  - On discharge hold hydrochlorothiazide until following up with   PCP in one week, continue Losartan 25 mg QD     #Hyperlipidemia  -Continue Rosuvastatin 20 mg     #Depression/Anxiety  - Continue PTA Zoloft 50 mg     #Hx of hypothyroidism  - Continue PTA Synthroid 125 mg QD    #Checklist  F: None  E: Replete PRN  N: Cardiac diet  VTE Prophylaxis: SCDs  GI prophylaxis: None  PCP: Michael Ca MD  CODE STATUS:   Code Status: Full Resuscitation    Disposition: Home    Discussed with [facility] and Attending: Dr. Sloan.  Please see attending physician addendum/note for final recommendations.    Fadiosei Ni  Internal Medicine PGY-1    9/30/20249:01 AM     No